# Patient Record
Sex: FEMALE | URBAN - METROPOLITAN AREA
[De-identification: names, ages, dates, MRNs, and addresses within clinical notes are randomized per-mention and may not be internally consistent; named-entity substitution may affect disease eponyms.]

---

## 2018-05-02 ENCOUNTER — HOSPITAL ENCOUNTER (OUTPATIENT)
Dept: LAB | Age: 68
Discharge: HOME | End: 2018-05-02

## 2018-05-02 PROCEDURE — 88305 TISSUE EXAM BY PATHOLOGIST: CPT | Performed by: INTERNAL MEDICINE

## 2018-05-02 PROCEDURE — 88312 SPECIAL STAINS GROUP 1: CPT | Performed by: INTERNAL MEDICINE

## 2023-02-20 ENCOUNTER — OFFICE VISIT (OUTPATIENT)
Dept: ENDOCRINOLOGY | Age: 73
End: 2023-02-20
Payer: MEDICARE

## 2023-02-20 VITALS
SYSTOLIC BLOOD PRESSURE: 104 MMHG | WEIGHT: 113 LBS | DIASTOLIC BLOOD PRESSURE: 78 MMHG | HEART RATE: 61 BPM | OXYGEN SATURATION: 100 %

## 2023-02-20 DIAGNOSIS — E04.2 MULTIPLE THYROID NODULES: ICD-10-CM

## 2023-02-20 DIAGNOSIS — E05.90 HYPERTHYROIDISM: Primary | ICD-10-CM

## 2023-02-20 DIAGNOSIS — E05.90 HYPERTHYROIDISM: ICD-10-CM

## 2023-02-20 LAB
T4 FREE SERPL-MCNC: 1.3 NG/DL (ref 0.78–1.46)
TSH, 3RD GENERATION: <0.005 UIU/ML (ref 0.36–3.74)

## 2023-02-20 PROCEDURE — 99204 OFFICE O/P NEW MOD 45 MIN: CPT | Performed by: INTERNAL MEDICINE

## 2023-02-20 PROCEDURE — G8427 DOCREV CUR MEDS BY ELIG CLIN: HCPCS | Performed by: INTERNAL MEDICINE

## 2023-02-20 PROCEDURE — G8484 FLU IMMUNIZE NO ADMIN: HCPCS | Performed by: INTERNAL MEDICINE

## 2023-02-20 PROCEDURE — G8400 PT W/DXA NO RESULTS DOC: HCPCS | Performed by: INTERNAL MEDICINE

## 2023-02-20 PROCEDURE — 1090F PRES/ABSN URINE INCON ASSESS: CPT | Performed by: INTERNAL MEDICINE

## 2023-02-20 PROCEDURE — 1123F ACP DISCUSS/DSCN MKR DOCD: CPT | Performed by: INTERNAL MEDICINE

## 2023-02-20 PROCEDURE — 4004F PT TOBACCO SCREEN RCVD TLK: CPT | Performed by: INTERNAL MEDICINE

## 2023-02-20 PROCEDURE — 3017F COLORECTAL CA SCREEN DOC REV: CPT | Performed by: INTERNAL MEDICINE

## 2023-02-20 PROCEDURE — G8421 BMI NOT CALCULATED: HCPCS | Performed by: INTERNAL MEDICINE

## 2023-02-20 RX ORDER — IPRATROPIUM BROMIDE 42 UG/1
SPRAY, METERED NASAL
COMMUNITY
Start: 2022-12-02

## 2023-02-20 RX ORDER — ESTRADIOL 0.1 MG/G
1 CREAM VAGINAL
COMMUNITY
Start: 2022-07-14

## 2023-02-20 RX ORDER — METOPROLOL SUCCINATE 25 MG/1
TABLET, EXTENDED RELEASE ORAL
COMMUNITY
Start: 2022-12-05

## 2023-02-20 RX ORDER — OMEPRAZOLE 20 MG/1
CAPSULE, DELAYED RELEASE ORAL
COMMUNITY
Start: 2022-12-05

## 2023-02-20 RX ORDER — CETIRIZINE HYDROCHLORIDE 10 MG/1
10 TABLET ORAL DAILY
COMMUNITY

## 2023-02-20 RX ORDER — ASPIRIN 81 MG/1
81 TABLET ORAL DAILY
COMMUNITY
Start: 2022-12-19 | End: 2023-12-19

## 2023-02-20 RX ORDER — ROSUVASTATIN CALCIUM 10 MG/1
TABLET, COATED ORAL
COMMUNITY
Start: 2022-12-05

## 2023-02-20 RX ORDER — ASCORBIC ACID 250 MG
250 TABLET ORAL DAILY
COMMUNITY

## 2023-02-20 RX ORDER — CLOPIDOGREL BISULFATE 75 MG/1
TABLET ORAL
COMMUNITY
Start: 2022-12-16

## 2023-02-20 ASSESSMENT — ENCOUNTER SYMPTOMS
VOICE CHANGE: 1
DIARRHEA: 0
ROS SKIN COMMENTS: DENIES ABNORMAL HAIR LOSS.  SHE REPORTS DRY SKIN.
CONSTIPATION: 0

## 2023-02-20 NOTE — PROGRESS NOTES
Lyle Padgett MD, [facility] Endocrinology and [facility]odule [facility]  [address], [address] [facility], [address] [place] [address]  Phone [phone]  Facsimile [phone]          Karen Cortes is a 74 y.o. female seen 2/20/2023 at the request of Dr. Melina Doe for the evaluation of hyperthyroidism        ASSESSMENT AND PLAN:    1. Hyperthyroidism  She was noted to have a suppressed TSH in the setting of a normal free T4, consistent with subclinical hyperthyroidism. Based on her thyroid ultrasound appearance, the differential diagnosis includes early Graves' disease versus thyroiditis. She had several tiny thyroid nodules bilaterally, but these were expected to cause hyperthyroidism. I will repeat her thyroid function tests today, including thyroid-stimulating immunoglobulins. We discussed the various treatment options in the event that she proves to have Graves' disease. Surgery, radioactive and medical therapy were discussed. If Graves' disease is confirmed, then I will likely start her on medical therapy with methimazole. If her thyroid-stimulating immunoglobulins are negative, this would be more suggestive of thyroiditis. I have recommended continued observation in that case since methimazole is not effective for the treatment of thyroiditis. 2. Multiple thyroid nodules  She has a longstanding history of multiple thyroid nodules. Limited thyroid ultrasound performed today revealed the presence of multiple subcentimeter nodules and cysts bilaterally without suspicious features. No further imaging surveillance is necessary per ACR TI-RADS guidelines. Procedures:    Limited thyroid ultrasound 2/28/2023: The thyroid echotexture is fairly homogeneous. Vascularity is normal.  There are several tiny nodules and cyst scattered throughout the right lobe. There is a possible isoechoic nodule in the mid left lobe measuring 0.28 x 0.39 cm (TR 3).       Follow-up and Dispositions    Return in about 6 weeks (around 4/3/2023), or Friday afternoon is okay. HISTORY OF PRESENT ILLNESS:    THYROID DISEASE    Presentation/Diagnosis: Abnormal TSH noted during the evaluation of thyrotoxic symptoms. Symptoms: See review of systems. She states that she had a sinus infection in 12/2022. No recent IV contrast exposure. Denies the use of iodine or biotin. Denies anterior neck pain. Treatment: None. Imaging:  Thyroid ultrasound 8/17/2016: There is a cyst in the superior left lobe measuring 0.17 x 0.12 cm. There is a cyst in the superior left lobe measuring 0.22 x 0.19 cm. There is a hypoechoic nodule in the mid left lobe measuring 0.47 x 0.42 cm. Thyroid ultrasound 10/30/2017: Small hypoechoic nodule in the right lobe measuring 0.2 cm. Small cyst in the inferior right lobe measuring 0.2 cm.  0.2 cm cyst in the left lobe. There is a second 0.2 cm cyst in the left lobe. In the isthmus there is a small hypoechoic nodule measuring 0.3 cm. Thyroid ultrasound 2/12/2019: In the mid right lobe there is a hypoechoic nodule measuring 0.2 cm. There is a cyst in the isthmus measuring 0.2 cm. There is a cyst in the left lobe measuring 0.2 x 0.1 cm. There is a second cyst measuring 0.2 cm. Thyroid ultrasound 2/24/2020: Solid hypoechoic nodule in the mid left lobe posteriorly measuring 0.4 cm (TR 4). There is a cyst in the mid right lobe measuring 0.2 to 0.3 cm. There is a cyst in the right isthmus measuring 0.2 cm. There is a cyst in the superior left lobe measuring 0.3 cm. There is a colloid cyst in the mid left lobe measuring 0.2 cm. Thyroid ultrasound 4/27/2021: There is a solid hypoechoic nodule in the mid right lobe measuring 0.2 cm. There is a solid hypoechoic nodule in the mid left lobe measuring 0.4 cm. Thyroid ultrasound 2/7/2023: The isthmus measures 0.3 cm, right lobe 3.4 x 1.2 x 1.6 cm, left lobe 3.2 x 1.2 x 1.2 cm. The thyroid gland is within normal limits and echogenicity.   There is a rounded isoechoic nodule in the mid left lobe measuring 0.9 x 0.4 x 0.8 cm. Additional subcentimeter cystic nodules do not have any suspicious features. Limited thyroid ultrasound 2/28/2023: The thyroid echotexture is fairly homogeneous. Vascularity is normal.  There are several tiny nodules and cyst scattered throughout the right lobe. There is a possible isoechoic nodule in the mid left lobe measuring 0.28 x 0.39 cm (TR 3). Labs:  12/7/2021: TSH 1.260.  2/1/2023: TSH <0.005, free T4 1.73, AST 33, ALT 25, alkaline phosphatase 114, 25-OH vitamin D 29.7. 2/3/2023: TSH <0.005, erythrocyte sedimentation rate 33. Review of Systems   Constitutional:  Positive for fatigue. Negative for diaphoresis. Weight decreased 10 pounds starting in late 12/2022. She reports a decreased appetite. HENT:  Positive for voice change (weak and shaky intermittently). Cardiovascular:  Positive for palpitations (worse at night). Gastrointestinal:  Negative for constipation and diarrhea (She has IBS and reports hyperdefecation. ). Endocrine: Negative for cold intolerance and heat intolerance. Skin:         Denies abnormal hair loss. She reports dry skin. Neurological:  Positive for tremors. Psychiatric/Behavioral:  Positive for sleep disturbance. The patient is nervous/anxious. Vital Signs:  /78   Pulse 61   Wt 113 lb (51.3 kg)   SpO2 100%     Physical Exam  Constitutional:       General: She is not in acute distress. Neck:      Thyroid: No thyroid mass, thyromegaly or thyroid tenderness. Cardiovascular:      Rate and Rhythm: Normal rate and regular rhythm. Lymphadenopathy:      Cervical: No cervical adenopathy. Neurological:      Motor: Tremor present.          Orders Placed This Encounter   Procedures    TSH     Standing Status:   Future     Standing Expiration Date:   2/20/2024    T4, Free     Standing Status:   Future     Standing Expiration Date:   2/20/2024    T3, Free Standing Status:   Future     Standing Expiration Date:   2/20/2024    Thyroid Stimulating Immunoglobulin     Standing Status:   Future     Standing Expiration Date:   2/20/2024    TSH     Standing Status:   Future     Standing Expiration Date:   2/20/2024    T4, Free     Standing Status:   Future     Standing Expiration Date:   2/20/2024    T3, Free     Standing Status:   Future     Standing Expiration Date:   2/20/2024    Hepatic Function Panel     Standing Status:   Future     Standing Expiration Date:   2/20/2024         Current Outpatient Medications   Medication Sig Dispense Refill    aspirin 81 MG EC tablet Take 81 mg by mouth daily      ascorbic acid (VITAMIN C) 250 MG tablet Take 250 mg by mouth daily      cetirizine (ZYRTEC) 10 MG tablet Take 10 mg by mouth daily      clopidogrel (PLAVIX) 75 MG tablet       estradiol (ESTRACE) 0.1 MG/GM vaginal cream Place 1 g vaginally Twice a Week      ipratropium (ATROVENT) 0.06 % nasal spray INSTILL 2 SPRAYS IN EACH NOSTRIL EVERY DAY      metoprolol succinate (TOPROL XL) 25 MG extended release tablet       omeprazole (PRILOSEC) 20 MG delayed release capsule       rosuvastatin (CRESTOR) 10 MG tablet TAKE 1 TABLET BY MOUTH EVERY OTHER DAY FOR CHOLESTEROL       No current facility-administered medications for this visit.

## 2023-02-22 ENCOUNTER — TELEPHONE (OUTPATIENT)
Dept: ENDOCRINOLOGY | Age: 73
End: 2023-02-22

## 2023-02-22 DIAGNOSIS — E05.90 HYPERTHYROIDISM: Primary | ICD-10-CM

## 2023-02-22 LAB
T3FREE SERPL-MCNC: 4.3 PG/ML (ref 2–4.4)
TSI ACT/NOR SER: 4.88 IU/L (ref 0–0.55)

## 2023-02-22 RX ORDER — METHIMAZOLE 5 MG/1
10 TABLET ORAL DAILY
Qty: 60 TABLET | Refills: 5 | Status: SHIPPED | OUTPATIENT
Start: 2023-02-22 | End: 2023-03-24

## 2023-02-22 NOTE — TELEPHONE ENCOUNTER
Her Graves' disease antibodies were elevated, so I would like for her to start methimazole to slow her thyroid gland down.

## 2023-02-23 NOTE — TELEPHONE ENCOUNTER
methIMAzole (TAPAZOLE) 5 MG tablet   Dose: 10 mg    Route: Oral    Frequency: DAILY  Dispense Quantity: 60 tablet    Refills: 5      Sig: Take 2 tablets by mouth daily  I spoke to the patient and she voiced understanding

## 2023-04-11 PROBLEM — E05.00 GRAVES' DISEASE: Status: ACTIVE | Noted: 2023-02-20

## 2023-07-06 ENCOUNTER — OFFICE VISIT (OUTPATIENT)
Dept: ENDOCRINOLOGY | Age: 73
End: 2023-07-06
Payer: MEDICARE

## 2023-07-06 VITALS — SYSTOLIC BLOOD PRESSURE: 102 MMHG | WEIGHT: 121 LBS | DIASTOLIC BLOOD PRESSURE: 80 MMHG

## 2023-07-06 DIAGNOSIS — E05.00 GRAVES' DISEASE: Primary | ICD-10-CM

## 2023-07-06 DIAGNOSIS — E04.2 MULTIPLE THYROID NODULES: ICD-10-CM

## 2023-07-06 PROCEDURE — 1090F PRES/ABSN URINE INCON ASSESS: CPT | Performed by: INTERNAL MEDICINE

## 2023-07-06 PROCEDURE — 3017F COLORECTAL CA SCREEN DOC REV: CPT | Performed by: INTERNAL MEDICINE

## 2023-07-06 PROCEDURE — G8400 PT W/DXA NO RESULTS DOC: HCPCS | Performed by: INTERNAL MEDICINE

## 2023-07-06 PROCEDURE — G8427 DOCREV CUR MEDS BY ELIG CLIN: HCPCS | Performed by: INTERNAL MEDICINE

## 2023-07-06 PROCEDURE — 99214 OFFICE O/P EST MOD 30 MIN: CPT | Performed by: INTERNAL MEDICINE

## 2023-07-06 PROCEDURE — G8421 BMI NOT CALCULATED: HCPCS | Performed by: INTERNAL MEDICINE

## 2023-07-06 PROCEDURE — 1123F ACP DISCUSS/DSCN MKR DOCD: CPT | Performed by: INTERNAL MEDICINE

## 2023-07-06 PROCEDURE — 4004F PT TOBACCO SCREEN RCVD TLK: CPT | Performed by: INTERNAL MEDICINE

## 2023-07-06 RX ORDER — METHIMAZOLE 5 MG/1
5 TABLET ORAL DAILY
Qty: 30 TABLET | Refills: 5 | Status: SHIPPED | OUTPATIENT
Start: 2023-07-06

## 2023-07-06 ASSESSMENT — ENCOUNTER SYMPTOMS
DIARRHEA: 0
CONSTIPATION: 0

## 2023-07-06 NOTE — PROGRESS NOTES
There is a cyst in the isthmus measuring 0.2 cm. There is a cyst in the left lobe measuring 0.2 x 0.1 cm. There is a second cyst measuring 0.2 cm. Thyroid ultrasound 2/24/2020: Solid hypoechoic nodule in the mid left lobe posteriorly measuring 0.4 cm (TR 4). There is a cyst in the mid right lobe measuring 0.2 to 0.3 cm. There is a cyst in the right isthmus measuring 0.2 cm. There is a cyst in the superior left lobe measuring 0.3 cm. There is a colloid cyst in the mid left lobe measuring 0.2 cm. Thyroid ultrasound 4/27/2021: There is a solid hypoechoic nodule in the mid right lobe measuring 0.2 cm. There is a solid hypoechoic nodule in the mid left lobe measuring 0.4 cm. Thyroid ultrasound 2/7/2023: The isthmus measures 0.3 cm, right lobe 3.4 x 1.2 x 1.6 cm, left lobe 3.2 x 1.2 x 1.2 cm. The thyroid gland is within normal limits and echogenicity. There is a rounded isoechoic nodule in the mid left lobe measuring 0.9 x 0.4 x 0.8 cm. Additional subcentimeter cystic nodules do not have any suspicious features. Limited thyroid ultrasound 2/28/2023: The thyroid echotexture is fairly homogeneous. Vascularity is normal.  There are several tiny nodules and cysts scattered throughout the right lobe. There is a possible isoechoic nodule in the mid left lobe measuring 0.28 x 0.39 cm (TR 3). Labs:  12/7/2021: TSH 1.260.  2/1/2023: TSH <0.005, free T4 1.73, AST 33, ALT 25, alkaline phosphatase 114, 25-OH vitamin D 29.7. 2/3/2023: TSH <0.005, erythrocyte sedimentation rate 33.  2/20/2023: TSH <0.005, free T4 1.3, free T3 4.3, thyroid-stimulating immunoglobulins 4.88.  4/4/2023: TSH 0.485, free T4 0.5, free T3 0.9, LFTs normal.  5/23/2023: TSH 1.220, free T4 0.93.  7/3/2023: TSH 1.190, free T4 0.94, LFTs normal except total protein 6.1 and alkaline phosphatase 109. Review of Systems   Constitutional:  Negative for diaphoresis and fatigue. Weight increased 6 pounds since last visit.   She is on

## 2023-09-11 ENCOUNTER — PATIENT MESSAGE (OUTPATIENT)
Dept: ENDOCRINOLOGY | Age: 73
End: 2023-09-11

## 2023-09-11 DIAGNOSIS — E05.00 GRAVES' DISEASE: ICD-10-CM

## 2023-09-11 RX ORDER — METHIMAZOLE 5 MG/1
2.5 TABLET ORAL DAILY
Qty: 15 TABLET | Refills: 5 | Status: SHIPPED | OUTPATIENT
Start: 2023-09-11

## 2023-09-11 NOTE — TELEPHONE ENCOUNTER
Alan Vásquez MA 9/11/2023 2:47 PM EDT      ----- Message -----  From: Johnnie Ya\"  Sent: 9/11/2023 2:36 PM EDT  To: sam [facility] Endocrinology Clinical Staff  Subject: Blood work results. Hi Dr. Aline Stock, two results from my bloodwork are on My Chart. Looks like my TSH is high and much higher than previous.

## 2023-11-28 ENCOUNTER — OFFICE VISIT (OUTPATIENT)
Dept: ENDOCRINOLOGY | Age: 73
End: 2023-11-28
Payer: MEDICARE

## 2023-11-28 VITALS — WEIGHT: 131 LBS | DIASTOLIC BLOOD PRESSURE: 80 MMHG | SYSTOLIC BLOOD PRESSURE: 110 MMHG

## 2023-11-28 DIAGNOSIS — E04.2 MULTIPLE THYROID NODULES: ICD-10-CM

## 2023-11-28 DIAGNOSIS — E05.00 GRAVES' DISEASE: Primary | ICD-10-CM

## 2023-11-28 PROCEDURE — G8421 BMI NOT CALCULATED: HCPCS | Performed by: INTERNAL MEDICINE

## 2023-11-28 PROCEDURE — G8400 PT W/DXA NO RESULTS DOC: HCPCS | Performed by: INTERNAL MEDICINE

## 2023-11-28 PROCEDURE — G8427 DOCREV CUR MEDS BY ELIG CLIN: HCPCS | Performed by: INTERNAL MEDICINE

## 2023-11-28 PROCEDURE — G8484 FLU IMMUNIZE NO ADMIN: HCPCS | Performed by: INTERNAL MEDICINE

## 2023-11-28 PROCEDURE — 3017F COLORECTAL CA SCREEN DOC REV: CPT | Performed by: INTERNAL MEDICINE

## 2023-11-28 PROCEDURE — 1090F PRES/ABSN URINE INCON ASSESS: CPT | Performed by: INTERNAL MEDICINE

## 2023-11-28 PROCEDURE — 1123F ACP DISCUSS/DSCN MKR DOCD: CPT | Performed by: INTERNAL MEDICINE

## 2023-11-28 PROCEDURE — 99214 OFFICE O/P EST MOD 30 MIN: CPT | Performed by: INTERNAL MEDICINE

## 2023-11-28 PROCEDURE — 4004F PT TOBACCO SCREEN RCVD TLK: CPT | Performed by: INTERNAL MEDICINE

## 2023-11-28 RX ORDER — METHIMAZOLE 5 MG/1
2.5 TABLET ORAL EVERY OTHER DAY
Qty: 8 TABLET | Refills: 5 | Status: SHIPPED | OUTPATIENT
Start: 2023-11-28

## 2023-11-28 ASSESSMENT — ENCOUNTER SYMPTOMS
DIARRHEA: 0
CONSTIPATION: 0

## 2023-11-28 NOTE — PROGRESS NOTES
Outpatient Medications   Medication Sig Dispense Refill    predniSONE (DELTASONE) 20 MG tablet Take 0.5 tablets by mouth daily Patient takes 5 mg      ergocalciferol (ERGOCALCIFEROL) 1.25 MG (78069 UT) capsule Take 1 capsule by mouth once a week      famotidine (PEPCID) 40 MG tablet Take 1 tablet by mouth every morning      aspirin 81 MG EC tablet Take 1 tablet by mouth daily      ascorbic acid (VITAMIN C) 250 MG tablet Take 1 tablet by mouth daily      cetirizine (ZYRTEC) 10 MG tablet Take 1 tablet by mouth daily      clopidogrel (PLAVIX) 75 MG tablet       estradiol (ESTRACE) 0.1 MG/GM vaginal cream Place 1 g vaginally Twice a Week      ipratropium (ATROVENT) 0.06 % nasal spray INSTILL 2 SPRAYS IN EACH NOSTRIL EVERY DAY      metoprolol succinate (TOPROL XL) 25 MG extended release tablet       omeprazole (PRILOSEC) 20 MG delayed release capsule 1 capsule Daily      rosuvastatin (CRESTOR) 10 MG tablet TAKE 1 TABLET BY MOUTH EVERY OTHER DAY FOR CHOLESTEROL      methIMAzole (TAPAZOLE) 5 MG tablet Take 0.5 tablets by mouth every other day 8 tablet 5     No current facility-administered medications for this visit.

## 2024-02-12 ENCOUNTER — PATIENT MESSAGE (OUTPATIENT)
Dept: ENDOCRINOLOGY | Age: 74
End: 2024-02-12

## 2024-02-12 NOTE — TELEPHONE ENCOUNTER
From: Camille Vargas  To: Dr. Lyle Gandhi  Sent: 2/12/2024 1:20 PM EST  Subject: Blood Work     Hi Dr. Gandhi, the results of my bloodwork from 2/9/24 are on my chart. Seems to me to be good numbers, hope you agree. Alayna Ya

## 2024-05-08 ENCOUNTER — OFFICE VISIT (OUTPATIENT)
Dept: ENDOCRINOLOGY | Age: 74
End: 2024-05-08
Payer: MEDICARE

## 2024-05-08 VITALS
WEIGHT: 131 LBS | SYSTOLIC BLOOD PRESSURE: 100 MMHG | DIASTOLIC BLOOD PRESSURE: 80 MMHG | OXYGEN SATURATION: 97 % | HEART RATE: 84 BPM

## 2024-05-08 DIAGNOSIS — E04.2 MULTIPLE THYROID NODULES: ICD-10-CM

## 2024-05-08 DIAGNOSIS — E05.00 GRAVES' DISEASE: Primary | ICD-10-CM

## 2024-05-08 PROCEDURE — 3017F COLORECTAL CA SCREEN DOC REV: CPT | Performed by: INTERNAL MEDICINE

## 2024-05-08 PROCEDURE — 1090F PRES/ABSN URINE INCON ASSESS: CPT | Performed by: INTERNAL MEDICINE

## 2024-05-08 PROCEDURE — 1123F ACP DISCUSS/DSCN MKR DOCD: CPT | Performed by: INTERNAL MEDICINE

## 2024-05-08 PROCEDURE — 4004F PT TOBACCO SCREEN RCVD TLK: CPT | Performed by: INTERNAL MEDICINE

## 2024-05-08 PROCEDURE — G8421 BMI NOT CALCULATED: HCPCS | Performed by: INTERNAL MEDICINE

## 2024-05-08 PROCEDURE — G8427 DOCREV CUR MEDS BY ELIG CLIN: HCPCS | Performed by: INTERNAL MEDICINE

## 2024-05-08 PROCEDURE — 99214 OFFICE O/P EST MOD 30 MIN: CPT | Performed by: INTERNAL MEDICINE

## 2024-05-08 PROCEDURE — G2211 COMPLEX E/M VISIT ADD ON: HCPCS | Performed by: INTERNAL MEDICINE

## 2024-05-08 PROCEDURE — G8399 PT W/DXA RESULTS DOCUMENT: HCPCS | Performed by: INTERNAL MEDICINE

## 2024-05-08 ASSESSMENT — ENCOUNTER SYMPTOMS
NAUSEA: 0
CONSTIPATION: 0
DIARRHEA: 0
VOMITING: 0

## 2024-05-08 NOTE — PROGRESS NOTES
Lyle Gandhi MD, [facility] Endocrinology and Thyroid Nodule [facility]  [address] [place] [address], [address]  [place] [address]  Phone [phone]  Facsimile [phone]          Camille Vargas is a 73 y.o. female seen 5/8/2024 for follow up of hyperthyroidism        ASSESSMENT AND PLAN:    1. Graves' disease  Based on her previously positive thyroid-stimulating immunoglobulins, she appears to have Graves' disease.  She is currently clinically and biochemically euthyroid off methimazole and therefore appears to be in remission.  Follow-up in 6 months.  I instructed her to let me know immediately if she symptoms of hyperthyroidism.  Most of her current symptoms are likely related to glucocorticoid withdrawal.      2. Multiple thyroid nodules  She has a longstanding history of multiple thyroid nodules.  Limited thyroid ultrasound performed 2/2023 revealed the presence of multiple subcentimeter nodules and cysts bilaterally without suspicious features.  No further imaging surveillance is necessary per ACR TI-RADS guidelines.      Follow-up and Dispositions    Return in about 6 months (around 11/8/2024).         HISTORY OF PRESENT ILLNESS:    THYROID DISEASE    Presentation/Diagnosis: Abnormal TSH noted during the evaluation of thyrotoxic symptoms.    Symptoms: See review of systems.      Treatment: Methimazole started 2/2023, stopped 2/2024.    Imaging:  Thyroid ultrasound 8/17/2016: There is a cyst in the superior left lobe measuring 0.17 x 0.12 cm.  There is a cyst in the superior left lobe measuring 0.22 x 0.19 cm.  There is a hypoechoic nodule in the mid left lobe measuring 0.47 x 0.42 cm.  Thyroid ultrasound 10/30/2017: Small hypoechoic nodule in the right lobe measuring 0.2 cm.  Small cyst in the inferior right lobe measuring 0.2 cm.  0.2 cm cyst in the left lobe.  There is a second 0.2 cm cyst in the left lobe.  In the isthmus there is a small hypoechoic nodule measuring 0.3 cm.  Thyroid ultrasound

## 2024-11-14 ENCOUNTER — TELEPHONE (OUTPATIENT)
Dept: ENDOCRINOLOGY | Age: 74
End: 2024-11-14

## 2024-11-19 ENCOUNTER — OFFICE VISIT (OUTPATIENT)
Dept: ENDOCRINOLOGY | Age: 74
End: 2024-11-19
Payer: MEDICARE

## 2024-11-19 VITALS
WEIGHT: 122 LBS | SYSTOLIC BLOOD PRESSURE: 102 MMHG | HEART RATE: 72 BPM | DIASTOLIC BLOOD PRESSURE: 64 MMHG | OXYGEN SATURATION: 96 % | BODY MASS INDEX: 24.6 KG/M2 | HEIGHT: 59 IN | RESPIRATION RATE: 14 BRPM

## 2024-11-19 DIAGNOSIS — E05.00 GRAVES' DISEASE: Primary | ICD-10-CM

## 2024-11-19 DIAGNOSIS — E04.2 MULTIPLE THYROID NODULES: ICD-10-CM

## 2024-11-19 PROCEDURE — 1090F PRES/ABSN URINE INCON ASSESS: CPT | Performed by: INTERNAL MEDICINE

## 2024-11-19 PROCEDURE — G8427 DOCREV CUR MEDS BY ELIG CLIN: HCPCS | Performed by: INTERNAL MEDICINE

## 2024-11-19 PROCEDURE — G8420 CALC BMI NORM PARAMETERS: HCPCS | Performed by: INTERNAL MEDICINE

## 2024-11-19 PROCEDURE — 99214 OFFICE O/P EST MOD 30 MIN: CPT | Performed by: INTERNAL MEDICINE

## 2024-11-19 PROCEDURE — 1036F TOBACCO NON-USER: CPT | Performed by: INTERNAL MEDICINE

## 2024-11-19 PROCEDURE — G8399 PT W/DXA RESULTS DOCUMENT: HCPCS | Performed by: INTERNAL MEDICINE

## 2024-11-19 PROCEDURE — 1123F ACP DISCUSS/DSCN MKR DOCD: CPT | Performed by: INTERNAL MEDICINE

## 2024-11-19 PROCEDURE — G8484 FLU IMMUNIZE NO ADMIN: HCPCS | Performed by: INTERNAL MEDICINE

## 2024-11-19 PROCEDURE — 1159F MED LIST DOCD IN RCRD: CPT | Performed by: INTERNAL MEDICINE

## 2024-11-19 ASSESSMENT — ENCOUNTER SYMPTOMS
DIARRHEA: 0
CONSTIPATION: 0

## 2024-11-19 NOTE — PROGRESS NOTES
1.10, LFTs normal except alkaline phosphatase 125.  2/9/2024: TSH 1.510, free T4 1.18.  4/9/2024: TSH 1.440, free T4 1.07, LFTs normal except AST 41 and alkaline phosphatase 121.  11/12/2024: TSH 1.240, free T4 1.00, free T3 3.11.      Review of Systems   Constitutional:  Negative for diaphoresis and fatigue.        Weight decreased 9 pounds since last visit through dietary changes and increased activity.  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     Eyes:         Denies proptosis, eye pain, excessive tearing, redness.   Cardiovascular:  Negative for palpitations.   Gastrointestinal:  Negative for constipation and diarrhea (She has IBS and reports hyperdefecation, stable).   Endocrine: Negative for cold intolerance and heat intolerance.   Neurological:  Negative for tremors.   Psychiatric/Behavioral:  Negative for sleep disturbance.        Vital Signs:  /64 (Site: Left Upper Arm, Position: Sitting, Cuff Size: Medium Adult)   Pulse 72   Resp 14   Ht 1.499 m (4' 11\")   Wt 55.3 kg (122 lb)   SpO2 96%   BMI 24.64 kg/m²     Wt Readings from Last 3 Encounters:   11/19/24 55.3 kg (122 lb)   05/08/24 59.4 kg (131 lb)   11/28/23 59.4 kg (131 lb)       Physical Exam  Constitutional:       General: She is not in acute distress.  Eyes:      Comments: No proptosis.   Neck:      Thyroid: No thyroid mass, thyromegaly or thyroid tenderness.   Cardiovascular:      Rate and Rhythm: Normal rate and regular rhythm.   Lymphadenopathy:      Cervical: No cervical adenopathy.   Neurological:      Motor: No tremor.           Orders Placed This Encounter   Procedures    TSH     Standing Status:   Future     Standing Expiration Date:   11/19/2025    T4, Free     Standing Status:   Future     Standing Expiration Date:   11/19/2025    T3, Free     Standing Status:   Future     Standing Expiration Date:   11/19/2025         Current Outpatient Medications   Medication Sig

## 2025-05-13 ENCOUNTER — TELEPHONE (OUTPATIENT)
Dept: ENDOCRINOLOGY | Age: 75
End: 2025-05-13

## 2025-05-19 ENCOUNTER — OFFICE VISIT (OUTPATIENT)
Dept: ENDOCRINOLOGY | Age: 75
End: 2025-05-19
Payer: MEDICARE

## 2025-05-19 VITALS
RESPIRATION RATE: 14 BRPM | HEIGHT: 59 IN | SYSTOLIC BLOOD PRESSURE: 126 MMHG | DIASTOLIC BLOOD PRESSURE: 82 MMHG | HEART RATE: 56 BPM | WEIGHT: 126 LBS | BODY MASS INDEX: 25.4 KG/M2

## 2025-05-19 DIAGNOSIS — E04.2 MULTIPLE THYROID NODULES: ICD-10-CM

## 2025-05-19 DIAGNOSIS — E05.00 GRAVES' DISEASE: Primary | ICD-10-CM

## 2025-05-19 PROCEDURE — G8399 PT W/DXA RESULTS DOCUMENT: HCPCS | Performed by: INTERNAL MEDICINE

## 2025-05-19 PROCEDURE — G8427 DOCREV CUR MEDS BY ELIG CLIN: HCPCS | Performed by: INTERNAL MEDICINE

## 2025-05-19 PROCEDURE — 1123F ACP DISCUSS/DSCN MKR DOCD: CPT | Performed by: INTERNAL MEDICINE

## 2025-05-19 PROCEDURE — 1090F PRES/ABSN URINE INCON ASSESS: CPT | Performed by: INTERNAL MEDICINE

## 2025-05-19 PROCEDURE — G8419 CALC BMI OUT NRM PARAM NOF/U: HCPCS | Performed by: INTERNAL MEDICINE

## 2025-05-19 PROCEDURE — 1036F TOBACCO NON-USER: CPT | Performed by: INTERNAL MEDICINE

## 2025-05-19 PROCEDURE — G2211 COMPLEX E/M VISIT ADD ON: HCPCS | Performed by: INTERNAL MEDICINE

## 2025-05-19 PROCEDURE — 1159F MED LIST DOCD IN RCRD: CPT | Performed by: INTERNAL MEDICINE

## 2025-05-19 PROCEDURE — 99213 OFFICE O/P EST LOW 20 MIN: CPT | Performed by: INTERNAL MEDICINE

## 2025-05-19 RX ORDER — LORATADINE 10 MG/1
10 CAPSULE, LIQUID FILLED ORAL DAILY
COMMUNITY

## 2025-05-19 ASSESSMENT — ENCOUNTER SYMPTOMS
DIARRHEA: 0
CONSTIPATION: 0

## 2025-05-19 NOTE — PROGRESS NOTES
Lyle Gandhi MD, [facility] Endocrinology and Thyroid Nodule Clinic  [address] [place] [address], [address]  [place] [address]  Phone [phone]  Facsimile [phone]          Camille Vargas is a 74 y.o. female seen 5/19/2025 for follow up of hyperthyroidism        ASSESSMENT AND PLAN:    1. Graves' disease  Based on her previously positive thyroid-stimulating immunoglobulins, she appears to have Graves' disease.  She is currently clinically and biochemically euthyroid off methimazole and therefore appears to be in remission.  Follow-up in 1 year.  I instructed her to let me know immediately if she symptoms of hyperthyroidism.      2. Multiple thyroid nodules  She has a longstanding history of multiple thyroid nodules.  Limited thyroid ultrasound performed 2/2023 revealed the presence of multiple subcentimeter nodules and cysts bilaterally without suspicious features.  No further imaging surveillance is necessary per ACR TI-RADS guidelines.      Follow-up and Dispositions    Return in about 1 year (around 5/19/2026).         HISTORY OF PRESENT ILLNESS:    THYROID DISEASE    Presentation/Diagnosis: Abnormal TSH noted during the evaluation of thyrotoxic symptoms.    Symptoms: See review of systems.      Treatment: Methimazole started 2/2023, stopped 2/2024.    Imaging:  Thyroid ultrasound 8/17/2016: There is a cyst in the superior left lobe measuring 0.17 x 0.12 cm.  There is a cyst in the superior left lobe measuring 0.22 x 0.19 cm.  There is a hypoechoic nodule in the mid left lobe measuring 0.47 x 0.42 cm.  Thyroid ultrasound 10/30/2017: Small hypoechoic nodule in the right lobe measuring 0.2 cm.  Small cyst in the inferior right lobe measuring 0.2 cm.  0.2 cm cyst in the left lobe.  There is a second 0.2 cm cyst in the left lobe.  In the isthmus there is a small hypoechoic nodule measuring 0.3 cm.  Thyroid ultrasound 2/12/2019: In the mid right lobe there is a hypoechoic nodule measuring 0.2 cm.  There